# Patient Record
Sex: FEMALE | Race: BLACK OR AFRICAN AMERICAN | NOT HISPANIC OR LATINO | Employment: UNEMPLOYED | URBAN - METROPOLITAN AREA
[De-identification: names, ages, dates, MRNs, and addresses within clinical notes are randomized per-mention and may not be internally consistent; named-entity substitution may affect disease eponyms.]

---

## 2022-10-24 ENCOUNTER — HOSPITAL ENCOUNTER (OUTPATIENT)
Dept: CT IMAGING | Facility: CLINIC | Age: 47
Discharge: HOME | End: 2022-10-24
Attending: FAMILY MEDICINE
Payer: COMMERCIAL

## 2022-10-24 ENCOUNTER — HOSPITAL ENCOUNTER (OUTPATIENT)
Dept: GENERAL RADIOLOGY | Facility: CLINIC | Age: 47
Discharge: HOME | End: 2022-10-24
Attending: FAMILY MEDICINE
Payer: COMMERCIAL

## 2022-10-24 DIAGNOSIS — R13.10 DYSPHAGIA, UNSPECIFIED TYPE: ICD-10-CM

## 2022-10-24 DIAGNOSIS — M54.50 LOW BACK PAIN WITH RADIATION: ICD-10-CM

## 2022-10-24 PROCEDURE — 250N000009 HC RX 250

## 2022-10-24 PROCEDURE — 250N000011 HC RX IP 250 OP 636

## 2022-10-24 PROCEDURE — 72100 X-RAY EXAM L-S SPINE 2/3 VWS: CPT

## 2022-10-24 PROCEDURE — 70491 CT SOFT TISSUE NECK W/DYE: CPT | Mod: 26 | Performed by: RADIOLOGY

## 2022-10-24 PROCEDURE — 70491 CT SOFT TISSUE NECK W/DYE: CPT

## 2022-10-24 RX ORDER — IOPAMIDOL 755 MG/ML
100 INJECTION, SOLUTION INTRAVASCULAR ONCE
Status: COMPLETED | OUTPATIENT
Start: 2022-10-24 | End: 2022-10-24

## 2022-10-24 RX ADMIN — SODIUM CHLORIDE 44 ML: 9 INJECTION, SOLUTION INTRAVENOUS at 11:03

## 2022-10-24 RX ADMIN — IOPAMIDOL 100 ML: 755 INJECTION, SOLUTION INTRAVENOUS at 11:02

## 2022-11-01 ENCOUNTER — TRANSCRIBE ORDERS (OUTPATIENT)
Dept: OTHER | Age: 47
End: 2022-11-01

## 2022-11-01 DIAGNOSIS — R13.10 DYSPHAGIA, UNSPECIFIED TYPE: Primary | ICD-10-CM

## 2022-11-01 DIAGNOSIS — R93.89 ABNORMAL CT SCAN, NECK: ICD-10-CM

## 2022-11-04 NOTE — TELEPHONE ENCOUNTER
FUTURE VISIT INFORMATION      FUTURE VISIT INFORMATION:    Date: 12/14/22    Time: 2:10pm    [place] [facility] [place] INFORMATION:    Referring provider:  Alec Tam    Referring providers clinic:  [facility]    Reason for visit/diagnosis  dysphagia, referral from Alec Tam - ok per Dante to start to [facility]     RECORDS REQUESTED FROM:       Clinic name Comments Records Status Imaging Status   [facility] 10/28/22- note from Alec Tam  10/19/22- note from Rupa Mejia MD   2/17/22 - note from Gail Yao APRN, CNP   12/21/21- [facility]  [facility]     Imaging  10/24/22- CT soft tissue, XR Lumbar Spine Epic  pacs

## 2022-11-10 NOTE — TELEPHONE ENCOUNTER
FUTURE VISIT INFORMATION      FUTURE VISIT INFORMATION:    Date: 2/6/23    Time: 1pm    [place] [facility] [place]  REFERRAL INFORMATION:    Referring provider:  Alec Tam    Referring providers clinic:  [facility]    Reason for visit/diagnosis  Per Pt, dx dysphagia, referral from Alec Tam recs in epic    RECORDS REQUESTED FROM:       Clinic name Comments Records Status Imaging Status   [facility] 10/28/22- note from Alec Tam  10/19/22- note from Rupa Mejia MD   2/17/22 - note from Gail Yao APRN, CNP   12/21/21- [facility]  [facility]      Imaging  10/24/22- CT soft tissue, XR Lumbar Spine Epic  pacs

## 2022-12-14 ENCOUNTER — PRE VISIT (OUTPATIENT)
Dept: OTOLARYNGOLOGY | Facility: CLINIC | Age: 47
End: 2022-12-14

## 2022-12-14 ENCOUNTER — OFFICE VISIT (OUTPATIENT)
Dept: OTOLARYNGOLOGY | Facility: CLINIC | Age: 47
End: 2022-12-14
Payer: COMMERCIAL

## 2022-12-14 VITALS
DIASTOLIC BLOOD PRESSURE: 75 MMHG | TEMPERATURE: 98.6 F | OXYGEN SATURATION: 98 % | HEART RATE: 92 BPM | WEIGHT: 188 LBS | SYSTOLIC BLOOD PRESSURE: 126 MMHG

## 2022-12-14 DIAGNOSIS — R13.10 DYSPHAGIA, UNSPECIFIED TYPE: Primary | ICD-10-CM

## 2022-12-14 PROCEDURE — 99203 OFFICE O/P NEW LOW 30 MIN: CPT | Mod: 25 | Performed by: REGISTERED NURSE

## 2022-12-14 PROCEDURE — 31575 DIAGNOSTIC LARYNGOSCOPY: CPT | Performed by: REGISTERED NURSE

## 2022-12-14 ASSESSMENT — PAIN SCALES - GENERAL: PAINLEVEL: NO PAIN (0)

## 2022-12-21 NOTE — PROGRESS NOTES
[facility]   Head and Neck Surgery  December 14, 2022    Referring Provider: Alec Tam NP     HPI: Deepak Regan is a 47 year old female who presents today for scope exam due to findings of effacement of the left piriform sinus with mucosal     enhancement and thickening of the left-sided hypopharynx that was reported on recent CT exam. Patient's history is obtained with assistance of a [facility]  and family member.  Patient states she was having significant left-sided facial pain and difficulty swallowing about a month ago.  Was seen by her dentist and found to have a significant dental infection.  Patient had tooth extraction and was treated with antibiotics which resolved her symptoms.  Today, patient denies any facial pain or difficulty swallowing.  Patient reports that swelling on the left side of her face has completely resolved.    Patient denies any dysphagia, odynophagia, new sore throat, mouth pain, ear pain, bleeding from the mouth, hemoptysis, voice changes or lumps/bumps of the neck.    Past Medical History:  No past medical history on file.    Past Surgical History:  No past surgical history on file.    Medications:  No current outpatient medications on file.       Allergies:  Allergies   Allergen Reactions     Chicken-Derived Products (Egg) Itching and Other (See Comments)      ROS: 10 point ROS neg other than the symptoms noted above in the HPI.    Physical Exam:    /75 (BP Location: Left arm, Patient Position: Sitting, Cuff Size: Adult Regular)   Pulse 92   Temp 98.6  F (37  C) (Temporal)   Wt 85.3 kg (188 lb)   SpO2 98%      Constitutional:  The patient was well-groomed and in no acute distress.     Neurologic: Alert and oriented x 3.  CN's III-XII within normal limits.  Voice normal.   Psychiatric: The patient's affect was calm, cooperative, and appropriate.    Communication:  Normal; communicates verbally, normal voice quality.   Respiratory:  Breathing comfortably without stridor or exertion of accessory muscles.   Head/Face:  Normocephalic and atraumatic.  No lesions or scars.   Salivary glands -  Normal size, no tenderness, swelling, or palpable masses    Ears: Pinnae and tragus non-tender.  EAC's and TM's were clear.    Oral Cavity: Normal tongue, floor of mouth, buccal mucosa, and palate.  No lesions or masses on inspection.    Oropharynx: Normal mucosa, palate symmetric with normal elevation. No abnormal lymph tissue in the oropharynx.    Neck: Supple with normal laryngeal and tracheal landmarks.  The parotid beds were without masses.  No palpable thyroid.  Normal range of motion.   Lymphatic: There is no palpable lymphadenopathy in the neck.     Flexible fiberoptic laryngoscopy: Scope exam was indicated due to CT findings of piriform effacement and hypopharyngeal edema. Verbal consent was obtained. The nasal cavity was prepped with an aerosolized solution of topical anesthetic and vasoconstrictive agent. The scope was passed through the anterior nasal cavity and advanced. Inspection of the nasopharynx revealed no gross abnormality. The base of tongue and vallecula are normal. The epiglottis, AE folds, false cords, true cords, arytenoids are normal. Inspection of the larynx revealed bilaterally mobile vocal cords. Pyriform sinuses are symmetric. The airway is patent. Procedure tolerated well with no immediate complications noted.    Labs and Imaging Reviewed:  Imaging:   CT neck  10/24/22  Impression: Effacement of the left piriform sinus with mucosal     enhancement and thickening of the left-sided hypopharynx     Assessment/Plan:  1. Dysphagia, unspecified type  Patient with symptoms of left facial pain and dysphagia that have since resolved after treatment of a severe dental infection.  Scope exam today is unremarkable without any concerning findings in the left piriform sinus or hypopharynx.  Anticipate that findings on CT scan are likely to to  edema from dental infection that has since resolved.      Recommend that patient continue to monitor symptoms and should follow-up as needed if she has return of dysphagia, pain with swallowing, hemoptysis, ear pain, or new lumps and bumps in the neck.     Gunjan Coates DNP, APRN, CNP  Otolaryngology  Head & Neck Surgery  [phone]    30 minutes spent on the date of the encounter doing chart review, history and exam, documentation and further activities per the note excluding time performing scope exam.

## 2023-02-03 ENCOUNTER — TELEPHONE (OUTPATIENT)
Dept: OTOLARYNGOLOGY | Facility: CLINIC | Age: 48
End: 2023-02-03
Payer: COMMERCIAL

## 2023-02-03 NOTE — TELEPHONE ENCOUNTER
Called pt to confirm appt with Dr. Howell, pt requested cancellation of appt. Stating she saw another provider and no longer having symptoms. Appt has been cancelled.

## 2023-02-06 ENCOUNTER — PRE VISIT (OUTPATIENT)
Dept: OTOLARYNGOLOGY | Facility: CLINIC | Age: 48
End: 2023-02-06

## 2023-02-22 ENCOUNTER — ANCILLARY PROCEDURE (OUTPATIENT)
Dept: CT IMAGING | Facility: CLINIC | Age: 48
End: 2023-02-22
Attending: FAMILY MEDICINE
Payer: COMMERCIAL

## 2023-02-22 DIAGNOSIS — R10.32 LLQ PAIN: ICD-10-CM

## 2023-02-22 LAB
CREAT BLD-MCNC: 0.6 MG/DL (ref 0.5–1)
GFR SERPL CREATININE-BSD FRML MDRD: >60 ML/MIN/1.73M2

## 2023-02-22 PROCEDURE — 74177 CT ABD & PELVIS W/CONTRAST: CPT | Mod: GC | Performed by: RADIOLOGY

## 2023-02-22 PROCEDURE — 82565 ASSAY OF CREATININE: CPT | Performed by: PATHOLOGY

## 2023-02-22 RX ORDER — IOPAMIDOL 755 MG/ML
104 INJECTION, SOLUTION INTRAVASCULAR ONCE
Status: COMPLETED | OUTPATIENT
Start: 2023-02-22 | End: 2023-02-22

## 2023-02-22 RX ADMIN — IOPAMIDOL 104 ML: 755 INJECTION, SOLUTION INTRAVASCULAR at 14:05

## 2023-05-18 ENCOUNTER — ANCILLARY PROCEDURE (OUTPATIENT)
Dept: ULTRASOUND IMAGING | Facility: CLINIC | Age: 48
End: 2023-05-18
Attending: FAMILY MEDICINE
Payer: COMMERCIAL

## 2023-05-18 DIAGNOSIS — R10.2 PELVIC PAIN: ICD-10-CM

## 2023-05-18 PROCEDURE — 76856 US EXAM PELVIC COMPLETE: CPT | Mod: GC | Performed by: RADIOLOGY

## 2023-05-18 PROCEDURE — 76830 TRANSVAGINAL US NON-OB: CPT | Mod: GC | Performed by: RADIOLOGY

## 2023-05-30 ENCOUNTER — TRANSCRIBE ORDERS (OUTPATIENT)
Dept: OTHER | Age: 48
End: 2023-05-30

## 2023-05-30 DIAGNOSIS — N85.00 ENDOMETRIAL HYPERPLASIA: Primary | ICD-10-CM

## 2023-06-08 ENCOUNTER — OFFICE VISIT (OUTPATIENT)
Dept: OBGYN | Facility: CLINIC | Age: 48
End: 2023-06-08
Attending: OBSTETRICS & GYNECOLOGY
Payer: COMMERCIAL

## 2023-06-08 VITALS — HEART RATE: 86 BPM | SYSTOLIC BLOOD PRESSURE: 118 MMHG | WEIGHT: 181 LBS | DIASTOLIC BLOOD PRESSURE: 79 MMHG

## 2023-06-08 DIAGNOSIS — R93.89 INCREASED ENDOMETRIAL STRIPE THICKNESS: Primary | ICD-10-CM

## 2023-06-08 DIAGNOSIS — N85.00 ENDOMETRIAL HYPERPLASIA: ICD-10-CM

## 2023-06-08 DIAGNOSIS — N91.2 AMENORRHEA: ICD-10-CM

## 2023-06-08 DIAGNOSIS — Z12.4 SCREENING FOR MALIGNANT NEOPLASM OF CERVIX: ICD-10-CM

## 2023-06-08 LAB
HCG UR QL: NEGATIVE
INTERNAL QC OK POCT: NORMAL
POCT KIT EXPIRATION DATE: NORMAL
POCT KIT LOT NUMBER: NORMAL

## 2023-06-08 PROCEDURE — 81025 URINE PREGNANCY TEST: CPT | Performed by: OBSTETRICS & GYNECOLOGY

## 2023-06-08 PROCEDURE — 88305 TISSUE EXAM BY PATHOLOGIST: CPT | Mod: 26 | Performed by: PATHOLOGY

## 2023-06-08 PROCEDURE — G0463 HOSPITAL OUTPT CLINIC VISIT: HCPCS | Mod: 25 | Performed by: OBSTETRICS & GYNECOLOGY

## 2023-06-08 PROCEDURE — 87624 HPV HI-RISK TYP POOLED RSLT: CPT | Performed by: OBSTETRICS & GYNECOLOGY

## 2023-06-08 PROCEDURE — 88305 TISSUE EXAM BY PATHOLOGIST: CPT | Mod: TC | Performed by: OBSTETRICS & GYNECOLOGY

## 2023-06-08 PROCEDURE — 58100 BIOPSY OF UTERUS LINING: CPT | Performed by: OBSTETRICS & GYNECOLOGY

## 2023-06-08 PROCEDURE — G0145 SCR C/V CYTO,THINLAYER,RESCR: HCPCS | Performed by: OBSTETRICS & GYNECOLOGY

## 2023-06-08 PROCEDURE — 99203 OFFICE O/P NEW LOW 30 MIN: CPT | Mod: 25 | Performed by: OBSTETRICS & GYNECOLOGY

## 2023-06-08 RX ORDER — CETIRIZINE HYDROCHLORIDE 10 MG/1
TABLET ORAL
COMMUNITY
Start: 2023-04-28

## 2023-06-08 RX ORDER — IBUPROFEN 600 MG/1
TABLET, FILM COATED ORAL
COMMUNITY
Start: 2022-11-17

## 2023-06-08 RX ORDER — BUTALBITAL/ACETAMINOPHEN 50MG-325MG
TABLET ORAL
COMMUNITY
Start: 2023-04-28

## 2023-06-08 RX ORDER — POLYETHYLENE GLYCOL 3350 17 G/17G
POWDER, FOR SOLUTION ORAL
COMMUNITY
Start: 2023-04-15

## 2023-06-08 RX ORDER — ATORVASTATIN CALCIUM 80 MG/1
1 TABLET, FILM COATED ORAL DAILY
COMMUNITY
Start: 2023-03-10

## 2023-06-08 RX ORDER — AMOXICILLIN 250 MG
1 CAPSULE ORAL
COMMUNITY
Start: 2023-03-10

## 2023-06-08 RX ORDER — MELOXICAM 15 MG/1
TABLET ORAL
COMMUNITY
Start: 2023-05-11

## 2023-06-08 RX ORDER — ASPIRIN 81 MG
TABLET, DELAYED RELEASE (ENTERIC COATED) ORAL
COMMUNITY
Start: 2023-03-11

## 2023-06-08 RX ORDER — METFORMIN HCL 500 MG
1 TABLET, EXTENDED RELEASE 24 HR ORAL
COMMUNITY
Start: 2023-01-31

## 2023-06-08 RX ORDER — OMEPRAZOLE 40 MG/1
CAPSULE, DELAYED RELEASE ORAL
COMMUNITY
Start: 2023-04-17

## 2023-06-08 RX ORDER — BLOOD SUGAR DIAGNOSTIC
STRIP MISCELLANEOUS
COMMUNITY
Start: 2021-12-01

## 2023-06-08 ASSESSMENT — PAIN SCALES - GENERAL: PAINLEVEL: NO PAIN (0)

## 2023-06-08 NOTE — LETTER
"2023       RE: Deepak Regan  [address]  [place] [address]     Dear Colleague,    Thank you for referring your patient, Deepak Regan, to the [facility] [place] [facility]. Please see a copy of my visit note below.    [facility] CLINIC VISIT    HPI: 47 year old  here for evaluation of thickened endometrium on US. US was done for pelvic pain. US showed cystic appearing endometrium measuring 8mm. No bleeding since LMP 3 years ago.     OBGYNHx:    x 2  No history of abnormal pap, unsure when last pap was collected  Past Medical History:   Diagnosis Date    Acid reflux disease     Diabetes mellitus (H)     Hyperlipidemia LDL goal <100     Seasonal allergic rhinitis      PSHx: No history of surgery  FHx: Non-contributory    O: /79   Pulse 86   Wt 82.1 kg (181 lb)   General: No distress  Abdomen: Soft, non-tender  Pelvic Exam:  Vulva: External genitalia with previous genital cutting noted.   Vagina: Moist, pink, no abnormal discharge, well rugated, no lesions  Cervix: smooth, pink, no visible lesions  Uterus: Normal size, anteverted, non-tender, mobile  Adnexa: Non-tender, no masses    Endometrial Biopsy    Menstrual History:      2023     1:19 PM   Menstrual History   Period Cycle (Days) no period last three years       Time Out - \"Pause for the Cause\"  Just before the procedure begins, through verbal and active participation of team members, verify:                      Initials   Patient Name [facility]   Patient  [facility]   Procedure to be performed [facility]                                                                                                                                      Indication: Thickened endometrium    Pregnancy test:  negative    Consent: Risks, benefits of treatment, and no treatment were discussed.  Patient's questions were elicited and answered. , Written consent signed and scanned into " medical record. and Patient received and verbalized understanding of discharge instructions    Using a medium Graves speculum, the cervix was visualized. The cervix was prepped with Betadine. . The endometrial pipelle was advanced through the cervix without difficulty and a sample collected. One additional pass was made. No bleeding noted.     EBL: 0ml    Complications:  None  Pathology: EMB sample was sent to pathology.  Tolerance of Procedure:  Patient did tolerate the procedure well.     Patient was instructed to call if she experiences any heavy bleeding, severe cramping, or abnormal vaginal discharge.  May take ibuprofen 400-800 mg PO TID PRN or naproxen 500 mg PO BID for cramping.    Pap smear collected today    Will notify patient of results via phone call with [facility] .     Beverly Sesay MD

## 2023-06-08 NOTE — PATIENT INSTRUCTIONS
Thank you for trusting us with your care!     If you need to contact us for questions about:  Symptoms, Scheduling & Medical Questions; Non-urgent (2-3 day response) Ana María message, Urgent (needing response today) [phone] (if after 3:30pm next day response)   Prescriptions: Please call your Pharmacy   Billing: [facility] [phone] or [facility]:[phone]

## 2023-06-08 NOTE — PROGRESS NOTES
"[facility] CLINIC VISIT    HPI: 47 year old  here for evaluation of thickened endometrium on US. US was done for pelvic pain. US showed cystic appearing endometrium measuring 8mm. No bleeding since LMP 3 years ago.     OBGYNHx:    x 2  No history of abnormal pap, unsure when last pap was collected  Past Medical History:   Diagnosis Date     Acid reflux disease      Diabetes mellitus (H)      Hyperlipidemia LDL goal <100      Seasonal allergic rhinitis      PSHx: No history of surgery  FHx: Non-contributory    O: /79   Pulse 86   Wt 82.1 kg (181 lb)   General: No distress  Abdomen: Soft, non-tender  Pelvic Exam:  Vulva: External genitalia with previous genital cutting noted.   Vagina: Moist, pink, no abnormal discharge, well rugated, no lesions  Cervix: smooth, pink, no visible lesions  Uterus: Normal size, anteverted, non-tender, mobile  Adnexa: Non-tender, no masses    Endometrial Biopsy    Menstrual History:      2023     1:19 PM   Menstrual History   Period Cycle (Days) no period last three years       Time Out - \"Pause for the Cause\"  Just before the procedure begins, through verbal and active participation of team members, verify:                      Initials   Patient Name [facility]   Patient  [facility]   Procedure to be performed [facility]                                                                                                                                      Indication: Thickened endometrium    Pregnancy test:  negative    Consent: Risks, benefits of treatment, and no treatment were discussed.  Patient's questions were elicited and answered. , Written consent signed and scanned into medical record. and Patient received and verbalized understanding of discharge instructions    Using a medium Graves speculum, the cervix was visualized. The cervix was prepped with Betadine. . The endometrial pipelle was advanced through the cervix without difficulty and a sample collected. One additional pass was " made. No bleeding noted.     EBL: 0ml    Complications:  None  Pathology: EMB sample was sent to pathology.  Tolerance of Procedure:  Patient did tolerate the procedure well.     Patient was instructed to call if she experiences any heavy bleeding, severe cramping, or abnormal vaginal discharge.  May take ibuprofen 400-800 mg PO TID PRN or naproxen 500 mg PO BID for cramping.    Pap smear collected today    Will notify patient of results via phone call with [place] .     Beverly Sesay MD

## 2023-06-08 NOTE — LETTER
"June 22, 2023      Deepak Regan  [address]  [place] [address]        Dear ,    We are writing to inform you of your test results.    Your biopsy of the uterus was normal.     Resulted Orders   hCG qual urine POCT   Result Value Ref Range    HCG Qual Urine Negative Negative    Internal QC Check POCT Valid Valid    POCT Kit Lot Number [id number]     POCT Kit Expiration Date 4-30-24    Surgical pathology exam [[id number]]   Result Value Ref Range    Case Report       Surgical Pathology Report                         Case: [id number]                                  Authorizing Provider:  Beverly Sesay MD Collected:           06/08/2023 01:59 PM          Ordering Location:     [facility]  Received:            06/09/2023 08:26 AM                                 [facility]                                                           Pathologist:           David Braxton MD                                                             Specimen:    Endometrium, Endometrial biopsy                                                            Final Diagnosis       Endometrium, biopsy:  - Atrophic endometrium  - Negative for hyperplasia or atypia      Clinical Information       47 year old postmenopausal female with an ultrasound finding of thickened endometrium.      Gross Description       A(A). Endometrium, Endometrial biopsy:  The specimen is received in formalin with proper patient identification, labeled \"endometrial biopsy\".  The specimen consists of a 2.0 x 2.0 by less than 0.1 cm aggregate of irregular tan tissue.  The specimen is wrapped, and submitted entirely in block A1.       Microscopic Description       Microscopic examination was performed.  A resident/fellow in an ACGME accredited training program was involved in the initial review, preparation, and/or " interpretation of this case.  I, as the senior physician, attest that I: (i) reviewed patient clinical records if indicated; (ii) reviewed relevant lab test results; (iii) examined the relevant preparation(s) for the specimen(s); and (iv) agree with the report, diagnosis(es), and interpretation as documented by the resident/fellow and edited/confirmed by me. Reporting resident/fellow: GERALDO Calvin MD      Performing Labs       The technical component of this testing was completed at [facility]      Case Images         If you have any questions or concerns, please call the clinic at the number listed above.       Sincerely,      Beverly Sesay MD

## 2023-06-12 LAB
BKR LAB AP GYN ADEQUACY: NORMAL
BKR LAB AP GYN INTERPRETATION: NORMAL
BKR LAB AP HPV REFLEX: NORMAL
BKR LAB AP PREVIOUS ABNORMAL: NORMAL
PATH REPORT.COMMENTS IMP SPEC: NORMAL
PATH REPORT.FINAL DX SPEC: NORMAL
PATH REPORT.GROSS SPEC: NORMAL
PATH REPORT.MICROSCOPIC SPEC OTHER STN: NORMAL
PATH REPORT.RELEVANT HX SPEC: NORMAL
PATH REPORT.RELEVANT HX SPEC: NORMAL
PHOTO IMAGE: NORMAL

## 2023-06-13 PROCEDURE — 87624 HPV HI-RISK TYP POOLED RSLT: CPT | Mod: ORL | Performed by: REGISTERED NURSE

## 2023-06-14 ENCOUNTER — LAB REQUISITION (OUTPATIENT)
Dept: LAB | Facility: CLINIC | Age: 48
End: 2023-06-14
Payer: COMMERCIAL

## 2023-06-14 LAB
HUMAN PAPILLOMA VIRUS 16 DNA: NEGATIVE
HUMAN PAPILLOMA VIRUS 18 DNA: NEGATIVE
HUMAN PAPILLOMA VIRUS FINAL DIAGNOSIS: NORMAL
HUMAN PAPILLOMA VIRUS OTHER HR: NEGATIVE

## 2023-06-19 ENCOUNTER — APPOINTMENT (OUTPATIENT)
Dept: INTERPRETER SERVICES | Facility: CLINIC | Age: 48
End: 2023-06-19
Payer: COMMERCIAL
